# Patient Record
(demographics unavailable — no encounter records)

---

## 2025-04-23 NOTE — HISTORY OF PRESENT ILLNESS
[de-identified] : 04/23/2025 HPI: VINI ABDALLA is a 77 year y/o F who presents for right knee pain since 04/19. NKI. Patient states that she woke up with this pain. Patient denies pervious knee pain prior to 04/19. Patient notes pain along the joint line. Patient notes symptoms worsen with WB activities such as walking and stairs. Patient denies any mechanical symptoms such as buckling or locking.  Patient has had a previous examination with [facility] for this issue and had XR imaging performed of her right knee.    Patient denies PMHx of DM. Patient is not on a blood thinner. Patient is on Aspirin 81mg daily.      Patient presents today, 77 year F, for evaluation of their right knee pain Date of Injury/Onset: since 4/19 Mechanism of injury: NKI. Patient woke up with pain on Saturday This is not a Work-Related Injury being treated under Worker's Compensation. This is not an athletic injury occurring associated with an interscholastic or organized sports team.   Pain: At Rest: 1 /10 With Activity: 5 /10 Quality of symptoms: aching in the right knee    Affecting Sleep: No Stiffness upon waking, lasting greater than 30mins: No Difficulty with stairs: being careful Difficulty getting in and out of car: no Difficulty applying shoe: No Sit to stand stiffness: yes a little, after prolonged Affects walking short/long distances? Yes Home/Work/Recreation affected? Yes   Improves with:  being careful Worse with:  prolonged activity Have you been treated for this in the past? [facility] 4/21 Have you had surgery for this in the past? none   OTC Medicines: none RX medicines:  none Heat, Ice, Elevation: knee wrap, ice, heat   CSI or Gel Injections: None Physical Therapy/ HEP: None   Previous Treatment/Imaging/Studies Since Last Visit: [facility] 4/21 Right knee x-ray

## 2025-04-23 NOTE — ASSESSMENT
[FreeTextEntry1] : Assessment:   VINI ABDALLA is a 77 year y/o F with history and physical exam consistent with right knee osteoarthritis, most severe in her PFJ.    Plan:   - Lengthy discussion regarding options was had with the patient. Nonsurgical options including but not limited to cortisone, Visco supplementation, Prescription anti-inflammatory medications (both steroidal and non-steroidal), activity modification, non-impact exercise, maintaining a healthy BMI, bracing, and icing were reviewed.   - Discussed Home exercise Program as well as Rest, Ice and elevation.   - At this time after discussion of the options, the patient would benefit from organized Physical Therapy to increase overall functionality.   - The patient was provided with an Rx in office today and was instructed to attend PT for 6-8 weeks in order to increase strength and ROM. Patient agreed with this plan and will begin PT as soon as they can.   -  Patient has tried OTC's medications including Ibuprofen, Aleve, etc or prescription NSAIDS, and/or exercises at home and/or physical therapy without satisfactory response,   Patient had decreased mobility in the joint and the risks benefits, and alternatives have been discussed, and verbal consent was obtained.   PROCEDURE: Large joint injection was performed of the RIGHT Knee. The indication for this procedure was pain and inflammation. The anterolateral site of the knee was prepped with betadine and sterile technique used. A 22-Gauge needle was used to inject a total of 5cc which comprised of 4cc of 1% Lidocaine and 1cc of 40mg Kenalog.   The patient tolerated the procedure well. There was no bleeding, a band aid was applied.  Patient was advised to call if redness, pain or fever occur and to apply ice for 15 minutes out of every hour for the next 12-24 hours as tolerated as soreness is common once the intra-articular lidocaine wears off.   -  Follow up in 3 months for re-evaluation - will consider repeat CSI vs HA injection at follow up visit given response to PT and CSI.    - The Patient was asked if they had any remaining questions about today's visit and the diagnosis, and all questions were answered. Patient understands the plan going forward.

## 2025-04-23 NOTE — IMAGING
[de-identified] :   RIGHT Knee examined.   Patient is in no acute distress, alert and oriented   Inspection: Skin is intact - Effusion Atrophy is not present Antalgic gait is not present   Palpation: ++ Medial joint line tenderness ++ Lateral joint line tenderness - Patellar tenderness - Pes anserine bursa + Popliteal fullness   Calf soft and nontender   Motion: Knee extension is 0 Knee flexion is 115 with medial joint line pain    Strength: Quadriceps strength is 5/5 Hamstring strength is 5/5   Special Tests: Lachman is normal Posterior drawer is normal Varus stress stability is normal Valgus stress stability is normal   - Medial Silvia test - Lateral Silvia test Patellofemoral grind test is normal Patellar apprehension test is normal   NV exam grossly intact distally. Sensation is grossly intact to light touch in the foot Motor function is 5/5 in the foot Capillary refill is less than 2 seconds in the toes Lymphadenopathy is not present Peripheral edema is not present  04/23 AP, Lateral, [place], Notch view of RIGHT knee. Mild to moderate DJD of tibiofemoral joint. No fractures noted. PFJ DJD noted.

## 2025-07-23 NOTE — ASSESSMENT
[FreeTextEntry1] : Assessment:   VINI ABDALLA is a 77 year y/o F with history and physical exam consistent with bilateral knee osteoarthritis. Patient's right knee osteoarthritis has improved with CSI at her last visit in 04/2025.   Patient also has left knee osteoarthritis.   Plan:   - Lengthy discussion regarding options was had with the patient. Nonsurgical options including but not limited to cortisone, Visco supplementation, Prescription anti-inflammatory medications (both steroidal and non-steroidal), activity modification, non-impact exercise, maintaining a healthy BMI, bracing, and icing were reviewed.   - Discussed Home exercise Program as well as Rest, Ice and elevation.   - At this time after discussion of the options, the patient would benefit from organized Physical Therapy to increase overall functionality.   - The patient was provided with an Rx in office today and was instructed to attend PT for 6-8 weeks in order to increase strength and ROM. Patient agreed with this plan and will begin PT as soon as they can.  - After discussing prescription anti-inflammatories, the patient is unable to take NSAIDs, due to history of kidney disease. Recommend tylenol as needed for pain. Recommend voltaren as needed.   Patient had decreased mobility in the joint and the risks benefits, and alternatives have been discussed, and verbal consent was obtained.   PROCEDURE: Large joint injection was performed of the LEFT Knee. The indication for this procedure was pain and inflammation. The anterolateral site of the knee was prepped with betadine and sterile technique used. A 22-Gauge needle was used to inject a total of 5cc which comprised of 4cc of 1% Lidocaine and 1cc of 40mg Kenalog.   The patient tolerated the procedure well. There was no bleeding, a band aid was applied.  Patient was advised to call if redness, pain or fever occur and to apply ice for 15 minutes out of every hour for the next 12-24 hours as tolerated as soreness is common once the intra-articular lidocaine wears off.   -  Follow up in 3 months for re-evaluation - will consider repeat CSI vs HA injection at follow up visit given response to PT and CSI.   Recommended patient f/u with her PCP to discuss her swelling in both lower extremities.    - The Patient was asked if they had any remaining questions about today's visit and the diagnosis, and all questions were answered. Patient understands the plan going forward.

## 2025-07-23 NOTE — IMAGING
[de-identified] :   RIGHT Knee examined.   Patient is in no acute distress, alert and oriented   Inspection: Skin is intact - Effusion 2+ pitting edema both lower extremities  Atrophy is not present Antalgic gait is not present   Palpation: ++ Medial joint line tenderness ++ Lateral joint line tenderness - Patellar tenderness - Pes anserine bursa + Popliteal fullness   Calf soft and nontender   Motion: Knee extension is 0 Knee flexion is 115 with medial joint line pain    Strength: Quadriceps strength is 5/5 Hamstring strength is 5/5   Special Tests: Lachman is normal Posterior drawer is normal Varus stress stability is normal Valgus stress stability is normal   - Medial Silvia test - Lateral Silvia test Patellofemoral grind test is normal Patellar apprehension test is normal  LEFT knee is examined.   Inspection: Skin is intact - Effusion 2+ pitting edema both lower extremities  Erythema LLE consistent with superficial cellulitis - no drainage  Atrophy is not present Antalgic gait is not present   Palpation: ++ Medial joint line tenderness ++ Lateral joint line tenderness - Patellar tenderness - Pes anserine bursa + Popliteal fullness   Calf soft and nontender   Motion: Knee extension is 0 Knee flexion is 115 with medial joint line pain    Strength: Quadriceps strength is 5/5 Hamstring strength is 5/5   Special Tests: Lachman is normal Posterior drawer is normal Varus stress stability is normal Valgus stress stability is normal   - Medial Silvia test - Lateral Silvia test Patellofemoral grind test is normal Patellar apprehension test is normal   NV exam grossly intact distally. Sensation is grossly intact to light touch in the foot Motor function is 5/5 in the foot Capillary refill is less than 2 seconds in the toes Lymphadenopathy is not present Peripheral edema is not present  07/23 AP, Lateral, [place], Notch view of LEFT knee. Moderate DJD of tibiofemoral joint. No fractures noted. PFJ DJD noted.   04/23 AP, Lateral, [place], Notch view of RIGHT knee. Moderate DJD of tibiofemoral joint. No fractures noted. PFJ DJD noted.

## 2025-07-23 NOTE — HISTORY OF PRESENT ILLNESS
[de-identified] : 07/23/2025: VINI ABDALLA is a 77 year y/o F who presents for a f/u evaluation of right knee pain. Patient was prescribed PT at her last visit. Patient has had very good improvement with right knee CSI given in office last visit on 04/23. Patient states she is feeling better today than last visit. States no pain in the right knee today but is having pain in the left knee - medial joint line worse with WB activities. States no new injury or prior treatment for the left knee.  Patient is not on a blood thinner. Patient does have a hx of kidney disease.   Patient on an abx prescribed by PCP for cellulitis of lower extremities.   04/23/2025 HPI: VINI ABDALLA is a 77 year y/o F who presents for right knee pain since 04/19. NKI. Patient states that she woke up with this pain. Patient denies pervious knee pain prior to 04/19. Patient notes pain along the joint line. Patient notes symptoms worsen with WB activities such as walking and stairs. Patient denies any mechanical symptoms such as buckling or locking.  Patient has had a previous examination with [facility] for this issue and had XR imaging performed of her right knee.    Patient denies PMHx of DM. Patient is not on a blood thinner. Patient is on Aspirin 81mg daily.      Patient presents today, 77 year F, for evaluation of their right knee pain Date of Injury/Onset: since 4/19 Mechanism of injury: NKI. Patient woke up with pain on Saturday This is not a Work-Related Injury being treated under Worker's Compensation. This is not an athletic injury occurring associated with an interscholastic or organized sports team.   Pain: At Rest: 1 /10 With Activity: 5 /10 Quality of symptoms: aching in the right knee    Affecting Sleep: No Stiffness upon waking, lasting greater than 30mins: No Difficulty with stairs: being careful Difficulty getting in and out of car: no Difficulty applying shoe: No Sit to stand stiffness: yes a little, after prolonged Affects walking short/long distances? Yes Home/Work/Recreation affected? Yes   Improves with:  being careful Worse with:  prolonged activity Have you been treated for this in the past? [facility] 4/21 Have you had surgery for this in the past? none   OTC Medicines: none RX medicines:  none Heat, Ice, Elevation: knee wrap, ice, heat   CSI or Gel Injections: None Physical Therapy/ HEP: None   Previous Treatment/Imaging/Studies Since Last Visit: [facility] 4/21 Right knee x-ray